# Patient Record
Sex: MALE | Race: WHITE
[De-identification: names, ages, dates, MRNs, and addresses within clinical notes are randomized per-mention and may not be internally consistent; named-entity substitution may affect disease eponyms.]

---

## 2023-02-16 ENCOUNTER — NURSE TRIAGE (OUTPATIENT)
Dept: OTHER | Facility: CLINIC | Age: 28
End: 2023-02-16

## 2023-02-16 ENCOUNTER — TELEPHONE (OUTPATIENT)
Dept: FAMILY MEDICINE CLINIC | Age: 28
End: 2023-02-16

## 2023-02-16 NOTE — TELEPHONE ENCOUNTER
Location of patient: [place]    Received call from [address] at [facility] with Red Flag Complaint. Subjective: Caller states \"I have swelling in my ankles and lower shins. and neck and back pain\"   No swelling currently  Current Symptoms:     Onset:  4 years ago; intermittent    Associated Symptoms:     Pain Severity: 7/10; stabbing; intermittent, waxing and waning    Temperature:      What has been tried: Ibuprofen    LMP: NA Pregnant: NA    Recommended disposition: See in Office Within 2 Weeks    Care advice provided, patient verbalizes understanding; denies any other questions or concerns; instructed to call back for any new or worsening symptoms. Patient/Caller agrees with recommended disposition; writer provided warm transfer to [facility] at [facility] for appointment scheduling    Attention Provider: Thank you for allowing me to participate in the care of your patient. The patient was connected to triage in response to information provided to the ECC/PSC. Please do not respond through this encounter as the response is not directed to a shared pool.       Reason for Disposition   Back pain is a chronic symptom (recurrent or ongoing AND lasting > 4 weeks)    Protocols used: Back Pain-ADULT-[place]